# Patient Record
Sex: FEMALE | Race: BLACK OR AFRICAN AMERICAN | NOT HISPANIC OR LATINO | Employment: UNEMPLOYED | URBAN - METROPOLITAN AREA
[De-identification: names, ages, dates, MRNs, and addresses within clinical notes are randomized per-mention and may not be internally consistent; named-entity substitution may affect disease eponyms.]

---

## 2018-03-19 ENCOUNTER — TELEPHONE (OUTPATIENT)
Dept: ADMINISTRATIVE | Facility: HOSPITAL | Age: 58
End: 2018-03-19

## 2019-10-10 PROBLEM — G93.2 INTRACRANIAL HYPERTENSION: Status: ACTIVE | Noted: 2019-10-02

## 2019-10-10 PROBLEM — G93.89 MASS OF BRAIN: Status: ACTIVE | Noted: 2019-09-18

## 2019-10-10 PROBLEM — Z72.0 TOBACCO ABUSE: Status: ACTIVE | Noted: 2019-10-02

## 2019-10-10 PROBLEM — R51.9 CHRONIC HEADACHE: Status: ACTIVE | Noted: 2019-10-02

## 2019-10-10 PROBLEM — D32.9 MENINGIOMA: Status: ACTIVE | Noted: 2019-10-02

## 2019-10-10 PROBLEM — G89.29 CHRONIC HEADACHE: Status: ACTIVE | Noted: 2019-10-02

## 2019-10-16 PROBLEM — G93.2 INTRACRANIAL HYPERTENSION: Status: RESOLVED | Noted: 2019-10-02 | Resolved: 2019-10-16

## 2020-02-04 ENCOUNTER — TELEPHONE (OUTPATIENT)
Dept: ADMINISTRATIVE | Facility: HOSPITAL | Age: 60
End: 2020-02-04

## 2020-02-04 ENCOUNTER — PATIENT OUTREACH (OUTPATIENT)
Dept: ADMINISTRATIVE | Facility: HOSPITAL | Age: 60
End: 2020-02-04

## 2020-02-11 ENCOUNTER — HISTORICAL (OUTPATIENT)
Dept: LAB | Facility: HOSPITAL | Age: 60
End: 2020-02-11

## 2020-04-22 ENCOUNTER — HISTORICAL (OUTPATIENT)
Dept: LAB | Facility: HOSPITAL | Age: 60
End: 2020-04-22

## 2021-03-22 ENCOUNTER — PATIENT OUTREACH (OUTPATIENT)
Dept: ADMINISTRATIVE | Facility: HOSPITAL | Age: 61
End: 2021-03-22

## 2021-03-22 DIAGNOSIS — Z12.31 ENCOUNTER FOR SCREENING MAMMOGRAM FOR MALIGNANT NEOPLASM OF BREAST: Primary | ICD-10-CM

## 2021-05-06 ENCOUNTER — PATIENT MESSAGE (OUTPATIENT)
Dept: RESEARCH | Facility: HOSPITAL | Age: 61
End: 2021-05-06

## 2021-07-01 ENCOUNTER — PATIENT MESSAGE (OUTPATIENT)
Dept: ADMINISTRATIVE | Facility: OTHER | Age: 61
End: 2021-07-01

## 2021-12-09 PROBLEM — R10.9 FLANK PAIN: Status: ACTIVE | Noted: 2020-01-13

## 2022-04-10 ENCOUNTER — HISTORICAL (OUTPATIENT)
Dept: ADMINISTRATIVE | Facility: HOSPITAL | Age: 62
End: 2022-04-10

## 2022-04-20 ENCOUNTER — PATIENT OUTREACH (OUTPATIENT)
Dept: ADMINISTRATIVE | Facility: HOSPITAL | Age: 62
End: 2022-04-20

## 2022-04-25 VITALS
SYSTOLIC BLOOD PRESSURE: 140 MMHG | WEIGHT: 147.5 LBS | DIASTOLIC BLOOD PRESSURE: 84 MMHG | HEIGHT: 64 IN | BODY MASS INDEX: 25.18 KG/M2

## 2022-05-02 ENCOUNTER — PATIENT MESSAGE (OUTPATIENT)
Dept: SMOKING CESSATION | Facility: CLINIC | Age: 62
End: 2022-05-02

## 2022-05-13 ENCOUNTER — TELEPHONE (OUTPATIENT)
Dept: ADMINISTRATIVE | Facility: HOSPITAL | Age: 62
End: 2022-05-13

## 2025-05-23 DIAGNOSIS — G44.009 CLUSTER HEADACHE, NOT INTRACTABLE, UNSPECIFIED CHRONICITY PATTERN: Primary | ICD-10-CM

## 2025-07-01 ENCOUNTER — TELEPHONE (OUTPATIENT)
Dept: NEUROSURGERY | Facility: CLINIC | Age: 65
End: 2025-07-01
Payer: MEDICAID

## 2025-07-01 DIAGNOSIS — D32.9 MENINGIOMA: ICD-10-CM

## 2025-07-01 DIAGNOSIS — R55 SYNCOPE, NEAR: Primary | ICD-10-CM

## 2025-07-01 NOTE — TELEPHONE ENCOUNTER
65 year old patient urgently referred to Dr. Tang by KAILYN Paredes for: Syncope, near [R55] Meningioma [D32.9]     MRI Brain W WO 6/2/25. Impression:  Unchanged 10 mm meningioma along the left parietal convexity near the vertex.  Imaging cannot be pushed.     Per referring 6/25/25 progress note:      Please review and advise. Requested CT report for comparison.

## 2025-07-02 NOTE — TELEPHONE ENCOUNTER
We can see her for the meningioma.  Next avaiable JUSTINE.  They need to bring CD with recent CT head and MRI brain and any other testing that is not in EPIC.  Brain MRI report from 6/2/2025 reveals the meningioma is stable in size.  The headaches are not due to the meningioma.  I recommend they refer her to Dr. Nathalie Tang at [facility].

## 2025-07-11 NOTE — PROGRESS NOTES
[facility]  History & Physical  Neurosurgery      Leigha Rodriguez   [id number]   1960     SUBJECTIVE:     Chief Complaint:  Meningioma    History of Present Illness:   Leigha Rodriguez is a 65 y.o. right handed female is seen today for meningioma at the recommendation of KAILYN Paredes. the patient presents today with a chief complaint of ongoing headaches.  The patient states she often goes to bed with a mild diffusely distributed headache and will wake with a severe headache.  She states occasionally she will also has some numbness into the upper and lower lips.  She states there has been several occasions in which she was exercising and became very faint in nearly blacked out.  She states when this occurs she will let which does provide her some relief.  The patient states despite attempting various medications her headaches have continued.  She states despite NSAIDs, tramadol, amitriptyline, gabapentin, Claritin and Fioricet her symptoms have persisted.  She is denying any issues with balance or changes in bowel or bladder function at this time.  She states she will occasionally have a shooting electric type sensation into the bilateral upper extremities.  She often struggles with neck stiffness and tightness which will radiate up to the back of the head and into the trapezius region bilaterally.  She is rating her pain a 6/10 today.  She states upon waking in the morning when she has a headache her headache will be a 10/10.  Patient reports she has previously established with cardiologist, Dr. Morgan and was advised she had a little blockage in the past although has not seen this provider in a couple of years.    Past Medical History:   Diagnosis Date    Abnormal Pap smear of cervix     Brain tumor 07/30/2019    Cervical radiculopathy     Essential (primary) hypertension     Meningioma     Migraine headache     CASSANDRA (obstructive sleep apnea)     Syncope, near         Past Surgical History:    Procedure Laterality Date    BLADDER SURGERY       SECTION      GALLBLADDER SURGERY      HYSTERECTOMY          Social History     Tobacco Use    Smoking status: Heavy Smoker     Current packs/day: 1.00     Types: Cigarettes    Smokeless tobacco: Never   Substance Use Topics    Alcohol use: Yes     Comment: occ        Family History   Problem Relation Name Age of Onset    No Known Problems Mother      No Known Problems Father          Review of patient's allergies indicates:   Allergen Reactions    Codeine Anaphylaxis    Penicillins Anaphylaxis        Current Outpatient Medications   Medication Instructions    amitriptyline (ELAVIL) 50 mg, Oral, Nightly PRN    amLODIPine (NORVASC) 5 mg, Daily    aspirin 81 MG Chew 1 tablet Orally Once a day    butalbital-acetaminophen-caff (FIORICET) -40 mg Cap 2 capsules as needed Orally twice daily for 10 days    doxycycline (VIBRA-TABS) 100 mg, 2 times daily    fluticasone propionate (FLONASE) 50 mcg/actuation nasal spray by Nasal route.    gabapentin (NEURONTIN) 600 mg, 3 times daily    ibuprofen (ADVIL,MOTRIN) 800 mg, 3 times daily    ketorolac (TORADOL) 10 mg, 3 times daily    loratadine (CLARITIN) 10 mg tablet TAKE 1 TABLET BY MOUTH NIGHTLY FOR 28 DAYS, THE AS NEEDED    losartan (COZAAR) 25 mg, Oral, Daily    naproxen (NAPROSYN) 500 mg, 2 times daily PRN    neomycin-polymyxin-hydrocortisone (CORTISPORIN) otic solution 4 drops into affected ear Otic Three times a day          Review of Systems   Constitutional:  Negative for chills, fever and weight loss.   HENT:  Negative for congestion, hearing loss, nosebleeds and tinnitus.    Eyes:  Negative for blurred vision, double vision and photophobia.   Respiratory:  Negative for cough, shortness of breath and wheezing.    Cardiovascular:  Negative for chest pain, palpitations and leg swelling.   Gastrointestinal:  Negative for constipation, diarrhea, nausea and vomiting.   Genitourinary:  Negative for dysuria,  "frequency and urgency.   Musculoskeletal:  Positive for neck pain (stiffness). Negative for back pain and falls.   Skin:  Negative for itching and rash.   Neurological:  Positive for dizziness (occasional near syncope), tingling (intermittent shooting and shocking into the bilateral arms and hands, perioral numbness intermittently) and headaches. Negative for tremors, sensory change, speech change, seizures and loss of consciousness.   Psychiatric/Behavioral:  Negative for depression, hallucinations and memory loss. The patient is not nervous/anxious.        OBJECTIVE:     Vital Signs:  Visit Vitals  /80 (BP Location: Left arm, Patient Position: Sitting)   Pulse (!) 51   Resp 16   Ht 5' 4" (1.626 m)   Wt 65.8 kg (145 lb)   BMI 24.89 kg/m²            Physical Exam     General:  Pleasant, Well-nourished, Well-groomed.    Head:  Normocephalic without any obvious abnormality. Atraumatic     CV:  Neck is supple.    Lungs:  Breathing is quiet, non-lablored    Neurological:    Oriented to Person, Place, Time   Speech: normal  Memory, cognition, and affect are appropriate.  Pupils are equal, round, and reactive to light,  Extraocular movements are intact.  Movements of facial expression are intact and symmetric.  no tremors noted, gait was normal, and no ataxic movements noted  normal strength, muscle mass, and tone in all extremities  Sensation is intact.  Gait is normal.    Imaging:  All pertinent neuroimaging independently reviewed. These findings were discussed in detail with the patient.     MRI of the brain with and without contrast dated 6/2/2025 reveal feels unchanged 10 mm meningioma in the left parietal convexity near the vertex.  This is per report as I am unable to view the patient's previous MRI of the brain with and without contrast dated 9/18/2019 as well as 1/8/2020.    X-rays of the cervical spine dated 7/15/2025 reveal straightening of normal cervical lordosis with mild levoscoliosis in the lower " cervical region. Slight anterolisthesis at C3-4 as well as slight retrolisthesis at C4-5.  Disc space narrowing noted at C4-5, C5-6 and C6-7 with multilevel osteophytic spurring.  Severe left foraminal stenosis is noted at C6-7 and C7-T1 with moderate bilateral foraminal stenosis noted at C4-5, C5-6 in on the right at C6-7.  C3-4 anterolisthesis does realign on extension and become more exaggerated on flexion with the development of a slight anterolisthesis at C2-3 on flexion noted as well.  ASSESSMENT:       ICD-10-CM ICD-9-CM   1. Meningioma  D32.9 225.2   2. Cervicalgia  M54.2 723.1   3. Cervicogenic headache  G44.86 784.0   4. Syncope, near  R55 780.2      PLAN:     1. Meningioma (Primary)  - Ambulatory referral/consult to Neurosurgery  - MRI Brain W WO Contrast; Future  - Creatinine, serum; Future    2. Cervicalgia  - X-Ray Cervical Spine 5 View W Flex Extxt; Future    3. Cervicogenic headache  - Ambulatory referral/consult to Neurology; Future    4. Syncope, near  - Ambulatory referral/consult to Neurosurgery  - Ambulatory referral/consult to Neurology; Future    Leigha Rodriguez presents today describing ongoing severe morning headaches with intermittent near syncopal episodes.  She also describes ongoing neck stiffness with intermittent shooting and shocking sensations into the bilateral upper extremities.  I did have the patient complete cervical x-rays while she was here in [place] today.  I am recommending the patient begin over the door cervical traction once she has obtained clearance from her cardiologist given her history of previous blockage and recent dizziness and near syncopal episodes.  We will refer her on for a neurology consultation regarding her headaches as well.  I did review the patient's MRI of the brain with her in clinic today in explained it is highly unlikely her meningiomas causing any type of symptoms as it has been unchanged since 2019.  There is also no edema noted on these  images.  She verbalizes understanding at this time.  We will plan to repeat a brain MRI in 3 years to monitor her meningioma.  Should her symptoms persist despite over the door cervical traction twice daily once she has obtained clearance from her cardiologist she was advised to contact our office for further evaluation.      Follow up in about 3 years (around 7/15/2028) for With Imaging Prior to Appt.    E/M Level Based On Time:   15 minutes spent on reviewing chart, which includes interpreting lab results and diagnostic tests.   30 minutes spent in the room with the patient performing a history and physical exam, counseling or educating the patient/caregiver, prescribing medications, ordering labwork/diagnostic tests, or placing referrals.   0 minutes spent collaborating plan of care with physician.   5 minutes spent documenting all relevant clinical informationin the electronic health record.     Total Time Spent: 50 minutes         Jeanne Kaliszeski, FNP [facility]  Neurosurgery Clinic    Disclaimer:  This note is prepared using voice recognition software and as such is likely to have errors despite attempts at proofreading. Please contact me for questions.

## 2025-07-15 ENCOUNTER — HOSPITAL ENCOUNTER (OUTPATIENT)
Dept: RADIOLOGY | Facility: HOSPITAL | Age: 65
Discharge: HOME | End: 2025-07-15
Attending: NURSE PRACTITIONER
Payer: MEDICAID

## 2025-07-15 ENCOUNTER — OFFICE VISIT (OUTPATIENT)
Dept: NEUROSURGERY | Facility: CLINIC | Age: 65
End: 2025-07-15
Payer: MEDICAID

## 2025-07-15 VITALS
WEIGHT: 145 LBS | SYSTOLIC BLOOD PRESSURE: 130 MMHG | DIASTOLIC BLOOD PRESSURE: 80 MMHG | HEIGHT: 64 IN | RESPIRATION RATE: 16 BRPM | HEART RATE: 51 BPM | BODY MASS INDEX: 24.75 KG/M2

## 2025-07-15 DIAGNOSIS — R55 SYNCOPE, NEAR: ICD-10-CM

## 2025-07-15 DIAGNOSIS — D32.9 MENINGIOMA: Primary | ICD-10-CM

## 2025-07-15 DIAGNOSIS — M54.2 CERVICALGIA: ICD-10-CM

## 2025-07-15 DIAGNOSIS — G44.86 CERVICOGENIC HEADACHE: ICD-10-CM

## 2025-07-15 PROCEDURE — 99204 OFFICE O/P NEW MOD 45 MIN: CPT | Mod: ,,, | Performed by: NURSE PRACTITIONER

## 2025-07-15 PROCEDURE — 72052 X-RAY EXAM NECK SPINE 6/>VWS: CPT | Mod: TC

## 2025-07-15 PROCEDURE — 4010F ACE/ARB THERAPY RXD/TAKEN: CPT | Mod: CPTII,,, | Performed by: NURSE PRACTITIONER

## 2025-07-15 PROCEDURE — 3075F SYST BP GE 130 - 139MM HG: CPT | Mod: CPTII,,, | Performed by: NURSE PRACTITIONER

## 2025-07-15 PROCEDURE — 1159F MED LIST DOCD IN RCRD: CPT | Mod: CPTII,,, | Performed by: NURSE PRACTITIONER

## 2025-07-15 PROCEDURE — 3008F BODY MASS INDEX DOCD: CPT | Mod: CPTII,,, | Performed by: NURSE PRACTITIONER

## 2025-07-15 PROCEDURE — 3079F DIAST BP 80-89 MM HG: CPT | Mod: CPTII,,, | Performed by: NURSE PRACTITIONER

## 2025-07-15 PROCEDURE — 1160F RVW MEDS BY RX/DR IN RCRD: CPT | Mod: CPTII,,, | Performed by: NURSE PRACTITIONER

## 2025-07-15 RX ORDER — TRAMADOL HYDROCHLORIDE 50 MG/1
TABLET, FILM COATED ORAL
COMMUNITY
End: 2025-07-15

## 2025-07-15 RX ORDER — METRONIDAZOLE 500 MG/1
500 TABLET ORAL 2 TIMES DAILY
COMMUNITY
Start: 2025-01-31 | End: 2025-07-15

## 2025-07-15 RX ORDER — METHOCARBAMOL 500 MG/1
TABLET, FILM COATED ORAL
COMMUNITY
End: 2025-07-15

## 2025-07-15 RX ORDER — NAPROXEN SODIUM 220 MG/1
TABLET, FILM COATED ORAL
COMMUNITY

## 2025-07-15 RX ORDER — NAPROXEN 500 MG/1
500 TABLET ORAL 2 TIMES DAILY PRN
COMMUNITY
Start: 2025-04-22

## 2025-07-15 RX ORDER — NEOMYCIN SULFATE, POLYMYXIN B SULFATE, HYDROCORTISONE 3.5; 10000; 1 MG/ML; [USP'U]/ML; MG/ML
SOLUTION/ DROPS AURICULAR (OTIC)
COMMUNITY

## 2025-07-15 RX ORDER — HYDROCHLOROTHIAZIDE 25 MG/1
TABLET ORAL
COMMUNITY
End: 2025-07-15

## 2025-07-15 RX ORDER — CEFDINIR 300 MG/1
600 CAPSULE ORAL
COMMUNITY
Start: 2025-05-16 | End: 2025-07-15

## 2025-07-15 RX ORDER — MELOXICAM 7.5 MG/1
7.5 TABLET ORAL
COMMUNITY
Start: 2025-02-21 | End: 2025-07-15

## 2025-07-15 RX ORDER — SUMATRIPTAN SUCCINATE 50 MG/1
TABLET ORAL
COMMUNITY
Start: 2025-06-05 | End: 2025-07-15

## 2025-07-15 RX ORDER — TRIAMCINOLONE ACETONIDE 1 MG/G
CREAM TOPICAL 2 TIMES DAILY
COMMUNITY
Start: 2025-05-19 | End: 2025-07-15

## 2025-07-15 RX ORDER — DOXYCYCLINE HYCLATE 100 MG
100 TABLET ORAL 2 TIMES DAILY
COMMUNITY
Start: 2025-01-27

## 2025-07-15 RX ORDER — PREDNISONE 10 MG/1
TABLET ORAL
COMMUNITY
Start: 2025-05-16 | End: 2025-07-15

## 2025-07-15 RX ORDER — NITROFURANTOIN 25; 75 MG/1; MG/1
100 CAPSULE ORAL 2 TIMES DAILY
COMMUNITY
Start: 2025-04-21 | End: 2025-07-15

## 2025-07-15 RX ORDER — PREDNISONE 20 MG/1
TABLET ORAL
COMMUNITY
Start: 2025-06-18 | End: 2025-07-15

## 2025-07-15 RX ORDER — LIDOCAINE 36 MG/1
PATCH TOPICAL
COMMUNITY
Start: 2024-11-02 | End: 2025-07-15

## 2025-07-15 RX ORDER — FAMOTIDINE 20 MG/1
20 TABLET, FILM COATED ORAL EVERY 12 HOURS
COMMUNITY
Start: 2025-01-24 | End: 2025-07-15

## 2025-07-15 RX ORDER — DICLOFENAC SODIUM 10 MG/G
GEL TOPICAL
COMMUNITY
End: 2025-07-15

## 2025-07-15 RX ORDER — KETOROLAC TROMETHAMINE 10 MG/1
10 TABLET, FILM COATED ORAL 3 TIMES DAILY
COMMUNITY
Start: 2025-02-07

## 2025-07-15 RX ORDER — CIPROFLOXACIN 500 MG/1
TABLET, FILM COATED ORAL
COMMUNITY
End: 2025-07-15

## 2025-07-15 RX ORDER — PRAVASTATIN SODIUM 20 MG/1
TABLET ORAL
COMMUNITY
Start: 2025-07-01 | End: 2025-07-15

## 2025-07-15 RX ORDER — BUTALBITAL, ACETAMINOPHEN AND CAFFEINE 300; 40; 50 MG/1; MG/1; MG/1
CAPSULE ORAL
COMMUNITY
Start: 2024-10-16